# Patient Record
Sex: FEMALE | Race: WHITE | NOT HISPANIC OR LATINO | Employment: UNEMPLOYED | URBAN - METROPOLITAN AREA
[De-identification: names, ages, dates, MRNs, and addresses within clinical notes are randomized per-mention and may not be internally consistent; named-entity substitution may affect disease eponyms.]

---

## 2017-06-23 ENCOUNTER — TELEPHONE (OUTPATIENT)
Dept: MEDICAL GROUP | Facility: MEDICAL CENTER | Age: 62
End: 2017-06-23

## 2017-06-23 RX ORDER — IRBESARTAN 300 MG/1
300 TABLET ORAL DAILY
Qty: 30 TAB | Refills: 0 | Status: SHIPPED | OUTPATIENT
Start: 2017-06-23 | End: 2017-07-10 | Stop reason: SDUPTHER

## 2017-06-23 NOTE — TELEPHONE ENCOUNTER
1. Caller Name: Manju Hobson                                         Call Back Number: [phone]      Patient approves a detailed voicemail message: yes    Pt is calling in to report she just moved here from [place]. Pt is going to run out of her BP medication and has an apt to est care on July 10th. Pt asking to have a Rx called into [facility] for (Irbesartan 300mg, once daily). Pt reports she has nine days left and will not make it until her apt, per scheduling Pt was told to check with the office.

## 2017-07-10 ENCOUNTER — OFFICE VISIT (OUTPATIENT)
Dept: MEDICAL GROUP | Facility: MEDICAL CENTER | Age: 62
End: 2017-07-10
Payer: COMMERCIAL

## 2017-07-10 VITALS
TEMPERATURE: 98.8 F | RESPIRATION RATE: 16 BRPM | OXYGEN SATURATION: 100 % | BODY MASS INDEX: 41.64 KG/M2 | DIASTOLIC BLOOD PRESSURE: 74 MMHG | SYSTOLIC BLOOD PRESSURE: 122 MMHG | WEIGHT: 235 LBS | HEART RATE: 87 BPM | HEIGHT: 63 IN

## 2017-07-10 DIAGNOSIS — Z12.11 ENCOUNTER FOR SCREENING COLONOSCOPY: ICD-10-CM

## 2017-07-10 DIAGNOSIS — E78.5 DYSLIPIDEMIA: ICD-10-CM

## 2017-07-10 DIAGNOSIS — Z87.81 HISTORY OF ANKLE FRACTURE: ICD-10-CM

## 2017-07-10 DIAGNOSIS — I10 ESSENTIAL HYPERTENSION: ICD-10-CM

## 2017-07-10 DIAGNOSIS — E66.01 MORBID OBESITY WITH BMI OF 40.0-44.9, ADULT (HCC): ICD-10-CM

## 2017-07-10 DIAGNOSIS — Z12.31 ENCOUNTER FOR SCREENING MAMMOGRAM FOR BREAST CANCER: ICD-10-CM

## 2017-07-10 PROCEDURE — 99204 OFFICE O/P NEW MOD 45 MIN: CPT | Performed by: NURSE PRACTITIONER

## 2017-07-10 RX ORDER — IRBESARTAN 300 MG/1
300 TABLET ORAL DAILY
Qty: 90 TAB | Refills: 3 | Status: SHIPPED | OUTPATIENT
Start: 2017-07-10 | End: 2018-02-17 | Stop reason: SDUPTHER

## 2017-07-10 ASSESSMENT — PATIENT HEALTH QUESTIONNAIRE - PHQ9: CLINICAL INTERPRETATION OF PHQ2 SCORE: 0

## 2017-07-10 NOTE — ASSESSMENT & PLAN NOTE
She's gained approximately 10 pounds in the last few months  Previously was swimming for exercise but has difficulty finding a local pool
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
## 2017-07-10 NOTE — MR AVS SNAPSHOT
"Manju Hobson   7/10/2017 2:00 PM   Office Visit   MRN: [id number]    Department:  [facility]   Dept Phone:  [phone]    Description:  Female : 1955   Provider:  WILNER Marquez           Reason for Visit     Establish Care           Allergies as of 7/10/2017     No Known Allergies      You were diagnosed with     Essential hypertension   [0154972]       Dyslipidemia   [048929]       History of ankle fracture   [065565]       Morbid obesity with BMI of 40.0-44.9, adult (CMS-[facility])   [521688]       Encounter for screening mammogram for breast cancer   [9926113]       Encounter for screening colonoscopy   [145054]         Vital Signs     Blood Pressure Pulse Temperature Respirations Height Weight    122/74 mmHg 87 37.1 °C (98.8 °F) 16 1.6 m (5' 2.99\") 106.595 kg (235 lb)    Body Mass Index Oxygen Saturation Smoking Status             41.64 kg/m2 100% Former Smoker         Basic Information     Date Of Birth Sex Race Ethnicity Preferred Language    1955 Female White Non- English      Problem List              ICD-10-CM Priority Class Noted - Resolved    Essential hypertension I10   7/10/2017 - Present    Dyslipidemia E78.5   7/10/2017 - Present    History of ankle fracture Z87.81   7/10/2017 - Present    Morbid obesity with BMI of 40.0-44.9, adult ([facility]) E66.01, Z68.41   7/10/2017 - Present      Health Maintenance        Date Due Completion Dates    IMM DTaP/Tdap/Td Vaccine (1 - Tdap) 1975 ---    PAP SMEAR 1977 ---    MAMMOGRAM 1996 ---    COLONOSCOPY 2006 ---    IMM ZOSTER VACCINE 2016 ---    IMM INFLUENZA (1) 2017 ---            Current Immunizations     No immunizations on file.      Below and/or attached are the medications your provider expects you to take. Review all of your home medications and newly ordered medications with your provider and/or pharmacist. Follow medication instructions as directed by your provider and/or pharmacist. " Please keep your medication list with you and share with your provider. Update the information when medications are discontinued, doses are changed, or new medications (including over-the-counter products) are added; and carry medication information at all times in the event of emergency situations     Allergies:  No Known Allergies          Medications  Valid as of: July 10, 2017 -  3:47 PM    Generic Name Brand Name Tablet Size Instructions for use    Irbesartan (Tab) AVAPRO 300 MG Take 1 Tab by mouth every day.        .                 Medicines prescribed today were sent to:     [id number] - [place], [place] - [id number] [facility] [place] [address]    Phone: [phone] Fax: [phone]    Open 24 Hours?: No      Medication refill instructions:       If your prescription bottle indicates you have medication refills left, it is not necessary to call your provider’s office. Please contact your pharmacy and they will refill your medication.    If your prescription bottle indicates you do not have any refills left, you may request refills at any time through one of the following ways: The online [employer] system (except Urgent Care), by calling your provider’s office, or by asking your pharmacy to contact your provider’s office with a refill request. Medication refills are processed only during regular business hours and may not be available until the next business day. Your provider may request additional information or to have a follow-up visit with you prior to refilling your medication.   *Please Note: Medication refills are assigned a new Rx number when refilled electronically. Your pharmacy may indicate that no refills were authorized even though a new prescription for the same medication is available at the pharmacy. Please request the medicine by name with the pharmacy before contacting your provider for a refill.        Your To Do List     Future Labs/Procedures Complete By  Expires    COMP METABOLIC PANEL  As directed 7/11/2018    LIPID PROFILE  As directed 7/11/2018    MA-SCREEN MAMMO W/CAD-BILAT  As directed 8/11/2018    OCCULT BLOOD FECES IMMUNOASSAY  As directed 7/11/2018      Referral     A referral request has been sent to our patient care coordination department. Please allow 3-5 business days for us to process this request and contact you either by phone or mail. If you do not hear from us by the 5th business day, please call us at [phone].           [employer] Access Code: [email or web address]  Expires: 8/9/2017  1:59 PM    [employer]  A secure, online tool to manage your health information     [employer]’s [employer]® is a secure, online tool that connects you to your personalized health information from the privacy of your home -- day or night - making it very easy for you to manage your healthcare. Once the activation process is completed, you can even access your medical information using the [employer] Media geraldine, which is available for free in the Apple Geraldine store or Google Play store.     V[employer] Media provides the following levels of access (as shown below):   My Chart Features   [facility] Primary Care Doctor [facility]  Specialists [facility]  Urgent  Care Non-[facility]  Primary Care  Doctor   Email your healthcare team securely and privately 24/7 X X X    Manage appointments: schedule your next appointment; view details of past/upcoming appointments X      Request prescription refills. X      View recent personal medical records, including lab and immunizations X X X X   View health record, including health history, allergies, medications X X X X   Read reports about your outpatient visits, procedures, consult and ER notes X X X X   See your discharge summary, which is a recap of your hospital and/or ER visit that includes your diagnosis, lab results, and care plan. X X       How to register for [employer]:  1. Go to  [email or web address].  2. Click on the Sign Up Now box, which takes you to the  New Member Sign Up page. You will need to provide the following information:  a. Enter your [employer] Access Code exactly as it appears at the top of this page. (You will not need to use this code after you’ve completed the sign-up process. If you do not sign up before the expiration date, you must request a new code.)   b. Enter your date of birth.   c. Enter your home email address.   d. Click Submit, and follow the next screen’s instructions.  3. Create a [employer] ID. This will be your [employer] login ID and cannot be changed, so think of one that is secure and easy to remember.  4. Create a [employer] password. You can change your password at any time.  5. Enter your Password Reset Question and Answer. This can be used at a later time if you forget your password.   6. Enter your e-mail address. This allows you to receive e-mail notifications when new information is available in [employer].  7. Click Sign Up. You can now view your health information.    For assistance activating your [employer] account, call [phone]

## 2017-07-10 NOTE — Clinical Note
[facility]  PRASAD Marquez.  [address]  [place] [address]  Fax: [phone]   Authorization for Release/Disclosure of   Protected Health Information   Name: CARLA HOBSON : 1955 SSN: [id number]   Address: [address] [place] [address]   [place] [address] Phone:    [phone] (home)    I authorize the entity listed below to release/disclose the PHI below to:   [facility]/WILNER Marquez and WILNER Marquez   Provider or Entity Name:  Dr Trenton Barron   Address   City, [facility] Phone:      Fax:     Reason for request: continuity of care   Information to be released:    [  ] LAST COLONOSCOPY,  including any PATH REPORT and follow-up  [  ] LAST FIT/COLOGUARD RESULT [  ] LAST DEXA  [  ] LAST MAMMOGRAM  [  ] LAST PAP  [  ] LAST LABS [  ] RETINA EXAM REPORT  [  ] IMMUNIZATION RECORDS  [ x ] Release all info      [  ] Check here and initial the line next to each item to release ALL health information INCLUDING  _____ Care and treatment for drug and / or alcohol abuse  _____ HIV testing, infection status, or AIDS  _____ Genetic Testing    DATES OF SERVICE OR TIME PERIOD TO BE DISCLOSED: _____________  I understand and acknowledge that:  * This Authorization may be revoked at any time by you in writing, except if your health information has already been used or disclosed.  * Your health information that will be used or disclosed as a result of you signing this authorization could be re-disclosed by the recipient. If this occurs, your re-disclosed health information may no longer be protected by State or Federal laws.  * You may refuse to sign this Authorization. Your refusal will not affect your ability to obtain treatment.  * This Authorization becomes effective upon signing and will  on (date) __________.      If no date is indicated, this Authorization will  one (1) year from the signature date.    Name: Carla Hobson    Signature:   Date:      7/10/2017       PLEASE FAX REQUESTED RECORDS BACK TO: [phone]

## 2017-07-10 NOTE — ASSESSMENT & PLAN NOTE
Reportedly mild last Oct  Statin recommended at the time but she opted for red yeast rice instead  Had been exercising regularly at the time but not so much recently

## 2017-08-08 ENCOUNTER — PATIENT MESSAGE (OUTPATIENT)
Dept: MEDICAL GROUP | Facility: MEDICAL CENTER | Age: 62
End: 2017-08-08

## 2017-08-21 ENCOUNTER — NON-PROVIDER VISIT (OUTPATIENT)
Dept: HEALTH INFORMATION MANAGEMENT | Facility: MEDICAL CENTER | Age: 62
End: 2017-08-21
Payer: COMMERCIAL

## 2017-08-21 VITALS — HEIGHT: 63 IN | WEIGHT: 235.7 LBS | BODY MASS INDEX: 41.76 KG/M2

## 2017-08-21 DIAGNOSIS — E66.01 MORBID OBESITY WITH BMI OF 40.0-44.9, ADULT (HCC): ICD-10-CM

## 2017-08-21 PROCEDURE — 97802 MEDICAL NUTRITION INDIV IN: CPT | Performed by: DIETITIAN, REGISTERED

## 2017-08-21 NOTE — PROGRESS NOTES
"8/21/2017    WILNER Marquez  62 y.o.   Time in/out: 1032 - 1116    Anthropometrics/Objective  Filed Vitals:    08/21/17 1122   Height: 1.6 m (5' 3\")   Weight: 106.913 kg (235 lb 11.2 oz)       Body mass index is 41.76 kg/(m^2).    Stated Goal Weight: < 200#    Subjective:  -Wants to lose weight  -Does not eat breakfast and has not eaten breakfast for as long as she remembers  -No regular exercise  -Trying to find a pool she can go to where she can swim because she loves to swim    Nutrition Diagnosis (PES Statement)    Morbid obesity related to excessive energy intake and inadequate energy expenditure as evidenced by BMI > 40.    Nutrition Intervention  Meal and Snack  Recommend a general/healthful diet    Comprehensive Nutrition education Instruction or training leading to in-depth nutrition related knowledge about:  Meal timing and spacing, Menu Planning, Metabolism of carb, protein, fat, Physical activity/exercise, Portion control and Handouts provided regarding topics discussed    Monitoring & Evaluation Plan  Behavioral-Environmental:  Behavior:  Start consuming a breakfast daily  Physical activity:  Increase as tolerated    Food / Nutrient Intake:  Food intake:  Use the plate method recommendations for portions/balance at meals    Physical Signs / Symptoms:  Weight change:  Weight loss to goal      Assessment Notes:  I am starting Manju off with portion control and challenging her to consume a breakfast daily.  She is going to consume a protein shake within one hour of waking up daily; this will start her body's metabolic rate and will also help her with eating less throughout the rest of the day.  She has been eating a large afternoon snack because she is extremely hungry; I believe that her hunger can be improved with protein in the morning and improved balance at lunch because she has been trying to avoid CHO at lunch.  We will work on what she is eating at future appointments, once she is " comfortable with improved portions.  She also is going to try to find a gym with a pool she can join and she is also interested in attending some fitness classes at a local senior center so she can move and meet more people in the area.  I want to see her again in one month to see how she is doing and to advance goals.

## 2017-08-21 NOTE — MR AVS SNAPSHOT
Manju Hobson   2017 10:30 AM   Appointment   MRN: [id number]    Department:  [facility]   Dept Phone:  [phone]    Description:  Female : 1955   Provider:  Jesse Coronel RD           Allergies as of 2017     No Known Allergies      Vital Signs     Smoking Status                   Former Smoker           Basic Information     Date Of Birth Sex Race Ethnicity Preferred Language    1955 Female White Non- English      Your appointments     Sep 25, 2017 10:00 AM   Follow Up Visit with Jesse Coronel RD   [facility])    [address]  [address]  [place] [id number] [phone]           It is the patient's responsibility to check with your Insurance for benefit coverage for visit / visits.  24 hours notice is required for all appointment changes or cancellation.  Please arrive 20 min. before your appointment time  Please bring the following with you: 1)Picture Id 2) Insurance card 3) Completed Forms if New Patient  If scheduled for DIABETES VISIT please also brin) Medications 2) Meter 3) Blood glucose logs 4) Any recent labs if you have them  If scheduled for NUTRITION VISIT please also brin) 2-3 days of detailed food intake logs 2) Blood glucose monitor and blood glucose logs (if you have them)              Problem List              ICD-10-CM Priority Class Noted - Resolved    Essential hypertension I10   7/10/2017 - Present    Dyslipidemia E78.5   7/10/2017 - Present    History of ankle fracture Z87.81   7/10/2017 - Present    Morbid obesity with BMI of 40.0-44.9, adult ([facility]) E66.01, Z68.41   7/10/2017 - Present      Health Maintenance        Date Due Completion Dates    IMM DTaP/Tdap/Td Vaccine (1 - Tdap) 1975 ---    PAP SMEAR 1977 ---    COLONOSCOPY 2006 ---    IMM ZOSTER VACCINE 2016 ---    IMM INFLUENZA (1) 2017 ---    MAMMOGRAM 2017            Current Immunizations     No  immunizations on file.      Below and/or attached are the medications your provider expects you to take. Review all of your home medications and newly ordered medications with your provider and/or pharmacist. Follow medication instructions as directed by your provider and/or pharmacist. Please keep your medication list with you and share with your provider. Update the information when medications are discontinued, doses are changed, or new medications (including over-the-counter products) are added; and carry medication information at all times in the event of emergency situations     Allergies:  No Known Allergies          Medications  Valid as of: August 21, 2017 - 11:18 AM    Generic Name Brand Name Tablet Size Instructions for use    Irbesartan (Tab) AVAPRO 300 MG Take 1 Tab by mouth every day.        .                 Medicines prescribed today were sent to:     [facility] [address] [place] [address], [place] - [address] [address]    Phone: [phone] Fax: [phone]    Open 24 Hours?: No      Medication refill instructions:       If your prescription bottle indicates you have medication refills left, it is not necessary to call your provider’s office. Please contact your pharmacy and they will refill your medication.    If your prescription bottle indicates you do not have any refills left, you may request refills at any time through one of the following ways: The online [employer] system (except Urgent Care), by calling your provider’s office, or by asking your pharmacy to contact your provider’s office with a refill request. Medication refills are processed only during regular business hours and may not be available until the next business day. Your provider may request additional information or to have a follow-up visit with you prior to refilling your medication.   *Please Note: Medication refills are assigned a new Rx number when refilled electronically. Your pharmacy may  indicate that no refills were authorized even though a new prescription for the same medication is available at the pharmacy. Please request the medicine by name with the pharmacy before contacting your provider for a refill.           [employer] Access Code: Activation code not generated  Current [place] Status: Active

## 2017-09-25 ENCOUNTER — NON-PROVIDER VISIT (OUTPATIENT)
Dept: HEALTH INFORMATION MANAGEMENT | Facility: MEDICAL CENTER | Age: 62
End: 2017-09-25
Payer: COMMERCIAL

## 2017-09-25 VITALS — HEIGHT: 63 IN | BODY MASS INDEX: 40.77 KG/M2 | WEIGHT: 230.1 LBS

## 2017-09-25 DIAGNOSIS — E66.01 MORBID OBESITY WITH BMI OF 40.0-44.9, ADULT (HCC): ICD-10-CM

## 2017-09-25 PROCEDURE — 97803 MED NUTRITION INDIV SUBSEQ: CPT | Performed by: DIETITIAN, REGISTERED

## 2017-09-25 NOTE — PROGRESS NOTES
"Nutrition Reassess    9/25/2017    WILNER Marquez   62 y.o.   Time in/out:  958 - 1014    Subjective:  -Protein shakes for breakfast help with hunger later in the day  -Tracking kcal now and this is helping her make better choices  -Walking for exercise every 2-3 days and states it is helping with her joint pain    Anthropometrics/Objective  Vitals:    09/25/17 1022   Weight: 104.4 kg (230 lb 1.6 oz)   Height: 1.6 m (5' 3\")     Body mass index is 40.76 kg/m².    Previous weight/date: 235.7#  Change:  - 5.6#     ReAssesment/Notes:  Manju has taken the information I gave her and run with it, making new habits in the process.  She was surprised that the protein shake in the morning would help her later in the day and is happy that she has started doing this; so happy, in fact, that she has told some of her friends about it to help them with weight loss.  She also is seeing the benefits of exercise beyond just weight loss with the improvement in her joint pain, which is motivating her to increase her activity when she feels like she can.  I want her to continue what she is doing because it is working and she can adjust her kcal intake if she feels like she has hit a plateau, which she talked through.  She will be leaving for [place] in December so we will meet before then to discuss what she plans to do in [place].    Follow-up: 6-8 weeks    "

## 2017-10-17 ENCOUNTER — HOSPITAL ENCOUNTER (OUTPATIENT)
Facility: MEDICAL CENTER | Age: 62
End: 2017-10-17
Attending: NURSE PRACTITIONER
Payer: COMMERCIAL

## 2017-10-17 PROCEDURE — 82274 ASSAY TEST FOR BLOOD FECAL: CPT

## 2017-10-18 ENCOUNTER — HOSPITAL ENCOUNTER (OUTPATIENT)
Dept: LAB | Facility: MEDICAL CENTER | Age: 62
End: 2017-10-18
Attending: NURSE PRACTITIONER
Payer: COMMERCIAL

## 2017-10-18 DIAGNOSIS — E78.5 DYSLIPIDEMIA: ICD-10-CM

## 2017-10-18 DIAGNOSIS — I10 ESSENTIAL HYPERTENSION: ICD-10-CM

## 2017-10-18 LAB
ALBUMIN SERPL BCP-MCNC: 4 G/DL (ref 3.2–4.9)
ALBUMIN/GLOB SERPL: 1.3 G/DL
ALP SERPL-CCNC: 63 U/L (ref 30–99)
ALT SERPL-CCNC: 22 U/L (ref 2–50)
ANION GAP SERPL CALC-SCNC: 10 MMOL/L (ref 0–11.9)
AST SERPL-CCNC: 16 U/L (ref 12–45)
BILIRUB SERPL-MCNC: 0.7 MG/DL (ref 0.1–1.5)
BUN SERPL-MCNC: 16 MG/DL (ref 8–22)
CALCIUM SERPL-MCNC: 9.2 MG/DL (ref 8.5–10.5)
CHLORIDE SERPL-SCNC: 106 MMOL/L (ref 96–112)
CHOLEST SERPL-MCNC: 235 MG/DL (ref 100–199)
CO2 SERPL-SCNC: 24 MMOL/L (ref 20–33)
CREAT SERPL-MCNC: 0.6 MG/DL (ref 0.5–1.4)
GFR SERPL CREATININE-BSD FRML MDRD: >60 ML/MIN/1.73 M 2
GLOBULIN SER CALC-MCNC: 3 G/DL (ref 1.9–3.5)
GLUCOSE SERPL-MCNC: 99 MG/DL (ref 65–99)
HDLC SERPL-MCNC: 52 MG/DL
LDLC SERPL CALC-MCNC: 142 MG/DL
POTASSIUM SERPL-SCNC: 4.2 MMOL/L (ref 3.6–5.5)
PROT SERPL-MCNC: 7 G/DL (ref 6–8.2)
SODIUM SERPL-SCNC: 140 MMOL/L (ref 135–145)
TRIGL SERPL-MCNC: 205 MG/DL (ref 0–149)

## 2017-10-18 PROCEDURE — 36415 COLL VENOUS BLD VENIPUNCTURE: CPT

## 2017-10-18 PROCEDURE — 80061 LIPID PANEL: CPT

## 2017-10-18 PROCEDURE — 80053 COMPREHEN METABOLIC PANEL: CPT

## 2017-10-19 DIAGNOSIS — Z12.11 ENCOUNTER FOR SCREENING COLONOSCOPY: ICD-10-CM

## 2017-10-20 LAB — HEMOCCULT STL QL IA: NEGATIVE

## 2017-10-30 ENCOUNTER — HOSPITAL ENCOUNTER (OUTPATIENT)
Dept: RADIOLOGY | Facility: MEDICAL CENTER | Age: 62
End: 2017-10-30
Attending: NURSE PRACTITIONER
Payer: COMMERCIAL

## 2017-10-30 DIAGNOSIS — Z12.31 ENCOUNTER FOR SCREENING MAMMOGRAM FOR BREAST CANCER: ICD-10-CM

## 2017-10-30 PROCEDURE — G0202 SCR MAMMO BI INCL CAD: HCPCS

## 2017-11-06 ENCOUNTER — OFFICE VISIT (OUTPATIENT)
Dept: MEDICAL GROUP | Facility: MEDICAL CENTER | Age: 62
End: 2017-11-06
Payer: COMMERCIAL

## 2017-11-06 ENCOUNTER — HOSPITAL ENCOUNTER (OUTPATIENT)
Facility: MEDICAL CENTER | Age: 62
End: 2017-11-06
Attending: NURSE PRACTITIONER
Payer: COMMERCIAL

## 2017-11-06 VITALS
BODY MASS INDEX: 40.57 KG/M2 | OXYGEN SATURATION: 95 % | DIASTOLIC BLOOD PRESSURE: 84 MMHG | HEIGHT: 63 IN | SYSTOLIC BLOOD PRESSURE: 124 MMHG | WEIGHT: 229 LBS | TEMPERATURE: 97.3 F | HEART RATE: 90 BPM

## 2017-11-06 DIAGNOSIS — E66.01 MORBID OBESITY WITH BMI OF 40.0-44.9, ADULT (HCC): ICD-10-CM

## 2017-11-06 DIAGNOSIS — I10 ESSENTIAL HYPERTENSION: ICD-10-CM

## 2017-11-06 DIAGNOSIS — Z01.419 ENCOUNTER FOR GYNECOLOGICAL EXAMINATION WITHOUT ABNORMAL FINDING: ICD-10-CM

## 2017-11-06 DIAGNOSIS — E78.5 DYSLIPIDEMIA: ICD-10-CM

## 2017-11-06 PROCEDURE — 88175 CYTOPATH C/V AUTO FLUID REDO: CPT

## 2017-11-06 PROCEDURE — 87624 HPV HI-RISK TYP POOLED RSLT: CPT

## 2017-11-06 PROCEDURE — 99396 PREV VISIT EST AGE 40-64: CPT | Performed by: NURSE PRACTITIONER

## 2017-11-06 NOTE — PROGRESS NOTES
CC:  Pap/Well Woman Exam    History of present illness:  Manju Hobson is 62 y.o.  postmenopausal female presenting today for well woman exam with gynecological exam and Pap smear.   Her last Pap was about 3 years ago and reportedly normal. She is , monogamous, denies concerns for STDs. Her blood pressure is controlled with irbesartan 300 mg daily. Recent labs reviewed, kidney function and fasting glucose normal. . She's been working with the dietitian and has had some weight loss. She is walking regularly for exercise  She recently had a mammogram which showed bilateral partially circumcised masses. She states that she has had nodules in both breasts which are noted on prior mammograms. Her record has been sent for comparison, she is waiting to hear back from radiologist    Past Medical History:   Diagnosis Date   • Allergy    • Arthritis        Past Surgical History:   Procedure Laterality Date   • ATHROPLASTY     • TUBAL COAGULATION LAPAROSCOPIC BILATERAL         Outpatient Encounter Prescriptions as of 2017   Medication Sig Dispense Refill   • irbesartan (AVAPRO) 300 MG Tab Take 1 Tab by mouth every day. 90 Tab 3     No facility-administered encounter medications on file as of 2017.        Patient Active Problem List    Diagnosis Date Noted   • Essential hypertension 07/10/2017   • Dyslipidemia 07/10/2017   • History of ankle fracture 07/10/2017   • Morbid obesity with BMI of 40.0-44.9, adult ([facility]) 07/10/2017       .  Social History     Social History   • Marital status:      Spouse name: N/A   • Number of children: N/A   • Years of education: N/A     Occupational History   • Not on file.     Social History Main Topics   • Smoking status: Former Smoker     Years: 7.00   • Smokeless tobacco: Never Used      Comment: quit in    • Alcohol use 0.0 oz/week   • Drug use: No   • Sexual activity: Yes     Partners: Male     Birth control/ protection: Surgical     Other Topics Concern  "  • Not on file     Social History Narrative   • No narrative on file       Family History   Problem Relation Age of Onset   • Family history unknown: Yes         ROS: Denies fatigue, chest pain, SOB, bowel or bladder changes. No concerning vaginal discharge or irritation, no dyspareunia or postcoital bleeding. Denies h/o migraine with aura. Denies musculoskeletal, neurological, or psychiatric problems.      /84   Pulse 90   Temp 36.3 °C (97.3 °F)   Ht 1.6 m (5' 3\")   Wt 103.9 kg (229 lb)   SpO2 95%   BMI 40.57 kg/m²     GEN:  Appears well and in no apparent distress   NECK:  Supple without adenopathy or thyromegaly  LUNGS:  Clear and equal. No wheeze, ronchi, or rales.  CV:  RRR, S1, S2. No murmur.  Pedal pulses 2+ bilaterally.  BREAST:  Symmetrical without masses. No nipple discharge.  ABD:  Soft, non-tender, non-distended, normal bowel sounds.  No hepatosplenomegaly.  :  Normal external female genitalia.  Vaginal canal clear.  Cervix appears normal. Specimen collected from transformation zone. Bimanual exam:  No CMT, normal size uterus without masses or tenderness; no adnexal masses or tenderness.      Assessment and plan    1. Encounter for gynecological examination without abnormal finding  Normal exam. Pap sent, follow-up pending results. Breast self-exam taught and encouraged monthly. General health and wellness discussion including healthy diet, regular exercise, encouraged to continue working on weight loss  - THINPREP PAP WITH HPV; Future  2. Essential hypertension  Stable  3. Dyslipidemia  Prior labs reviewed, discussed that this may improve with diet and weight loss. Will plan for recheck in 6 months  4. Morbid obesity with BMI of 40.0-44.9, adult ([facility])  Continue working with dietitian, exercising regularly      F/u pending results    "

## 2017-11-07 LAB
CYTOLOGY REG CYTOL: NORMAL
HPV HR 12 DNA CVX QL NAA+PROBE: NEGATIVE
HPV16 DNA SPEC QL NAA+PROBE: NEGATIVE
HPV18 DNA SPEC QL NAA+PROBE: NEGATIVE
SPECIMEN SOURCE: NORMAL

## 2017-11-13 ENCOUNTER — HOSPITAL ENCOUNTER (OUTPATIENT)
Dept: RADIOLOGY | Facility: MEDICAL CENTER | Age: 62
End: 2017-11-13
Attending: NURSE PRACTITIONER
Payer: COMMERCIAL

## 2017-11-13 DIAGNOSIS — R92.8 ABNORMAL MAMMOGRAM: ICD-10-CM

## 2017-11-13 PROCEDURE — 76642 ULTRASOUND BREAST LIMITED: CPT | Mod: RT

## 2017-11-13 PROCEDURE — G0204 DX MAMMO INCL CAD BI: HCPCS

## 2017-11-16 ENCOUNTER — HOSPITAL ENCOUNTER (OUTPATIENT)
Facility: MEDICAL CENTER | Age: 62
End: 2017-11-16
Attending: RADIOLOGY
Payer: COMMERCIAL

## 2017-11-16 ENCOUNTER — HOSPITAL ENCOUNTER (OUTPATIENT)
Dept: RADIOLOGY | Facility: MEDICAL CENTER | Age: 62
End: 2017-11-16
Attending: NURSE PRACTITIONER
Payer: COMMERCIAL

## 2017-11-16 DIAGNOSIS — N63.0 BREAST MASS: ICD-10-CM

## 2017-11-16 PROCEDURE — 88305 TISSUE EXAM BY PATHOLOGIST: CPT

## 2017-11-16 PROCEDURE — 19083 BX BREAST 1ST LESION US IMAG: CPT

## 2017-11-20 ENCOUNTER — TELEPHONE (OUTPATIENT)
Dept: RADIOLOGY | Facility: MEDICAL CENTER | Age: 62
End: 2017-11-20

## 2017-11-21 ENCOUNTER — PATIENT MESSAGE (OUTPATIENT)
Dept: MEDICAL GROUP | Facility: MEDICAL CENTER | Age: 62
End: 2017-11-21

## 2017-11-21 ENCOUNTER — OFFICE VISIT (OUTPATIENT)
Dept: MEDICAL GROUP | Facility: MEDICAL CENTER | Age: 62
End: 2017-11-21
Payer: COMMERCIAL

## 2017-11-21 VITALS
HEART RATE: 82 BPM | BODY MASS INDEX: 40.57 KG/M2 | HEIGHT: 63 IN | TEMPERATURE: 97.1 F | WEIGHT: 229 LBS | SYSTOLIC BLOOD PRESSURE: 140 MMHG | DIASTOLIC BLOOD PRESSURE: 98 MMHG | OXYGEN SATURATION: 93 % | RESPIRATION RATE: 18 BRPM

## 2017-11-21 DIAGNOSIS — Z87.81 HISTORY OF ANKLE FRACTURE: ICD-10-CM

## 2017-11-21 DIAGNOSIS — M72.2 PLANTAR FASCIITIS OF RIGHT FOOT: ICD-10-CM

## 2017-11-21 DIAGNOSIS — M72.2 PLANTAR FASCIITIS OF LEFT FOOT: ICD-10-CM

## 2017-11-21 DIAGNOSIS — M76.61 ACHILLES TENDINITIS OF RIGHT LOWER EXTREMITY: ICD-10-CM

## 2017-11-21 PROCEDURE — 99214 OFFICE O/P EST MOD 30 MIN: CPT | Performed by: FAMILY MEDICINE

## 2017-11-21 RX ORDER — IBUPROFEN 600 MG/1
600 TABLET ORAL EVERY 6 HOURS PRN
Qty: 30 TAB | COMMUNITY
Start: 2017-11-21

## 2017-11-21 RX ORDER — NAPROXEN SODIUM 220 MG
220-440 TABLET ORAL
COMMUNITY
Start: 2017-11-21

## 2017-11-21 RX ORDER — ACETAMINOPHEN 500 MG
500-1000 TABLET ORAL EVERY 6 HOURS PRN
Qty: 30 TAB | Refills: 0 | Status: SHIPPED | OUTPATIENT
Start: 2017-11-21

## 2017-11-21 NOTE — ASSESSMENT & PLAN NOTE
Patient states that she was recently driving around town over the last week from underneath her Friday without using her right ankle brace/prosthetic on a regular basis. Therefore, patient is having increased pain to the inferior portion of her foot, near the calcaneal prominence, as well as pain in the posterior inferior aspect of her right lower extremity near the Achilles tendon.    Patient has been taking some Tylenol and consistently over the last week, states that this has been helping somewhat, however has not been taking a regular basis.    Of note, patient has had a history of right ankle fracture, for which she was diagnosed in [place]. Patient plans on returning to [place] during the [place] holiday season in order to reestablish care with her former orthopedic surgeon. Patient has high hopes that she can reestablish care there, and potentially have surgery performed in [place].    ROS is negative for fevers, chills, right lower extremity radicular pain, right foot drop, increase or change in gait instability.

## 2017-11-21 NOTE — ASSESSMENT & PLAN NOTE
Please see notes from same-day service 11/21/2017 regarding Achilles tendinitis of right lower extremity.

## 2017-11-21 NOTE — ASSESSMENT & PLAN NOTE
Please see notes from same date of service 11/21/2017 regarding Achilles tendinitis of right lower extremity.

## 2017-11-21 NOTE — TELEPHONE ENCOUNTER
From: Manju Hobson  To: Ignacio Junior M.D.  Sent: 11/21/2017 11:13 AM PST  Subject: Non-Urgent Medical Question    Just looking over the referral form and on the diagnosis it says plantar fasciitis of the left foot, but everything is related to the Right foot.

## 2017-11-21 NOTE — PROGRESS NOTES
Subjective:   Chief Complaint/History of Present Illness:  Manju Hobson is a 62 y.o. female established patient who presents today to Discuss right ankle and right posterior calf pains, initial evaluation.:    Achilles tendinitis of right lower extremity  Patient states that she was recently driving around town over the last week from underneath her Friday without using her right ankle brace/prosthetic on a regular basis. Therefore, patient is having increased pain to the inferior portion of her foot, near the calcaneal prominence, as well as pain in the posterior inferior aspect of her right lower extremity near the Achilles tendon.    Patient has been taking some Tylenol and consistently over the last week, states that this has been helping somewhat, however has not been taking a regular basis.    Of note, patient has had a history of right ankle fracture, for which she was diagnosed in [place]. Patient plans on returning to [place] during the Christmas holiday season in order to reestablish care with her former orthopedic surgeon. Patient has high hopes that she can reestablish care there, and potentially have surgery performed in [place].    ROS is negative for fevers, chills, right lower extremity radicular pain, right foot drop, increase or change in gait instability.    Plantar fasciitis of right foot  Please see notes from same-day service 11/21/2017 regarding Achilles tendinitis of right lower extremity.    History of ankle fracture  Please see notes from same date of service 11/21/2017 regarding Achilles tendinitis of right lower extremity.      Patient Active Problem List    Diagnosis Date Noted   • Achilles tendinitis of right lower extremity 11/21/2017   • Plantar fasciitis of right foot 11/21/2017   • Essential hypertension 07/10/2017   • Dyslipidemia 07/10/2017   • History of ankle fracture 07/10/2017   • Morbid obesity with BMI of 40.0-44.9, adult ([facility]) 07/10/2017       Additional History:   Allergies:  "   Patient has no known allergies.     Current Medications:     Current Outpatient Prescriptions   Medication Sig Dispense Refill   • ibuprofen (MOTRIN) 600 MG Tab Take 1 Tab by mouth every 6 hours as needed. 30 Tab    • naproxen (ANAPROX) 220 MG tablet Take 1-2 Tabs by mouth 2 times daily with meals as needed.     • acetaminophen (TYLENOL) 500 MG Tab Take 1-2 Tabs by mouth every 6 hours as needed. 30 Tab 0   • irbesartan (AVAPRO) 300 MG Tab Take 1 Tab by mouth every day. 90 Tab 3     No current facility-administered medications for this visit.         Social History:     Social History   Substance Use Topics   • Smoking status: Former Smoker     Years: 7.00   • Smokeless tobacco: Never Used      Comment: quit in 1979   • Alcohol use 0.0 oz/week       ROS:     - NOTE: All other systems reviewed and are negative, except as in HPI.     Objective:   Physical Exam:    Vitals: Blood pressure 140/98, pulse 82, temperature 36.2 °C (97.1 °F), resp. rate 18, height 1.6 m (5' 3\"), weight 103.9 kg (229 lb), SpO2 93 %, not currently breastfeeding.   BMI: Body mass index is 40.57 kg/m².   General/Constitutional: Vitals as above, Well nourished, well developed female in no acute distress   Head/Eyes: Head is grossly normal & atraumatic, bilateral conjunctivae clear and not injected, bilateral EOMI, bilateral PERRL   ENT: Bilateral external ears grossly normal in appearance, Hearing grossly intact, External nares normal in appearance and without discharge/bleeding   Respiratory: No respiratory distress, bilateral lungs are clear to ausculation in all lung fields (anterior/lateral/posterior), no wheezing/rhonchi/rales   Cardiovascular: Regular rate and rhythm without murmur/gallops/rubs, distal pulses are intact and equal bilaterally (radial, posterior tibial), no bilateral lower extremity edema   MSK: Patient with right inferior posterior calf pain to the gastrocnemius and soleus muscles on palpation as well as the right Achilles " tendon, patient with point tenderness to the proximal two thirds of the plantar fascia on right foot, Gait grossly abnormal due to right lower extremity ankle fixator   Integumentary: No apparent rashes   Psych: Judgment grossly appropriate, no apparent depression/anxiety    Health Maintenance:     -Not addressed at this visit -- no normal flu shot in stock.    Imaging/Labs:     -Not addressed at this visit.    Assessment and Plan:   1. Achilles tendinitis of right lower extremity  2. Plantar fasciitis of right foot  3. History of ankle fracture  Uncontrolled, patient given conservative care instructions via stretching exercises source from sports medicine advisor, also referred to podiatry for further evaluation and management for her 3 pathologies. Patient instructed to use a combination of Tylenol as well as an NSAID for pain control as well as anti-inflammatory relief. Patient instructed to not use both ibuprofen as well as naproxen, both medications are listed for Mrs. Patient declines me sending in these prescriptions to the pharmacy.   - REFERRAL TO PODIATRY   - ibuprofen (MOTRIN) 600 MG Tab; Take 1 Tab by mouth every 6 hours as needed.  Dispense: 30 Tab   - naproxen (ANAPROX) 220 MG tablet; Take 1-2 Tabs by mouth 2 times daily with meals as needed.   - acetaminophen (TYLENOL) 500 MG Tab; Take 1-2 Tabs by mouth every 6 hours as needed.  Dispense: 30 Tab; Refill: 0      RTC: As needed.    NOTE (11/22/17): I incorrectly listed patient plantar fasciitis as left foot when it was actually the right foot.    PLEASE NOTE: This dictation was created using voice recognition software. I have made every reasonable attempt to correct obvious errors, but I expect that there are errors of grammar and possibly content that I did not discover before finalizing the note.

## 2017-11-22 ENCOUNTER — NON-PROVIDER VISIT (OUTPATIENT)
Dept: HEALTH INFORMATION MANAGEMENT | Facility: MEDICAL CENTER | Age: 62
End: 2017-11-22
Payer: COMMERCIAL

## 2017-11-22 VITALS — HEIGHT: 63 IN | BODY MASS INDEX: 40.88 KG/M2 | WEIGHT: 230.7 LBS

## 2017-11-22 DIAGNOSIS — E66.01 MORBID OBESITY WITH BMI OF 40.0-44.9, ADULT (HCC): ICD-10-CM

## 2017-11-22 PROCEDURE — 97803 MED NUTRITION INDIV SUBSEQ: CPT | Performed by: DIETITIAN, REGISTERED

## 2017-11-22 NOTE — PROGRESS NOTES
"Nutrition Reassess    11/22/2017    WILNER Marquez   62 y.o.   Time in/out:  1305 - 1317    Subjective:  -Has not been able to walk as much d/t foot/ankle pain  -Tracking kcal and started eating ~1200 kcal/day and has seen weight loss since  -Admits she gained some weight while awaiting some news about a biopsy - ate comfort foods  -Tried to limit the portions of the comfort foods she was consuming    Anthropometrics/Objective  Vitals:    11/22/17 1317   Weight: 104.6 kg (230 lb 11.2 oz)   Height: 1.6 m (5' 3\")     Body mass index is 40.87 kg/m².    Previous weight/date: 230.1#  Change:  + 0.6# (- 5# total)   ReAssesment/Notes:  Manju is happy that she has essentially maintained her weight and knows that by tracking and eating 1200 kcal/day she will see weight loss.  She is much more active in [place] - she is leaving in 3 weeks - and knows that she will be able to continue to make good choices while she is there.  We discussed that she can take her new mindfulness and learn from past experiences so she does not turn to comfort foods when she is emotional and feels like she needs to eat.  She is going to contact me when she is back from [place], although she does not know when that will be.    Follow-up: prn    "